# Patient Record
(demographics unavailable — no encounter records)

---

## 2024-12-16 NOTE — DISCUSSION/SUMMARY
[Medication Risks Reviewed] : Medication risks reviewed [Surgical risks reviewed] : Surgical risks reviewed [de-identified] : This is a 58-year-old female with bone-on-bone left hip osteoarthritis with chronic pain. She failed to response to rest activity modification and use of Tylenol and Advil. She has a history of a right hip replacement on February 17, 2022 with good outcome. The nature of condition and treatment options were discussed in detail. Pt is a candidate for left GUSTAVO, and she has scheduled for surgery to proceed on 2/6/2024.The surgery was discussed in detail including pre-op and post-op. The pt is having worse pain with walking, stairs, exercise, getting into and out of a car, and at nighttime. Her quality of life is deteriorating. The pt has exhausted over 3 months of conservative treatment including cortisone injections, home therapy, anti-inflammatories, Tylenol. I believe left GUSTAVO is reasonable.  All questions and concerns were answered.  The patient has been counseled regarding the elevated risks associated with surgical complications in patients with a BMI> 35. The patient demonstrates an understanding of the increased risk. After a lengthy discussion, the patient agreed to make a coordinated effort at weight loss prior to surgical intervention. The patient understands our BMI policy and they will make a conscious effort to improve their BMI.  The patient is a 58 year old individual with end stage arthritis of their left hip joint. Based upon the patient's continued symptoms and failure to respond to conservative treatment, including Tylenol and anti-inflammatories, physical therapy, activity modification , local modalities such as apply ice or heat, and injections. I have recommended a left hip replacement for this patient. A long discussion took place with the patient describing what a total joint replacement is and what the procedure would entail. A total hip arthroplasty model, similar to the implant that will be used during the operation, was utilized to demonstrate and to discuss the various bearing surfaces of the implants. The hospitalization and post-operative care and rehabilitation were also discussed. The use of perioperative antibiotics and DVT prophylaxis were discussed. The risk, benefits and alternatives to a surgical intervention were discussed at length with the patient. The patient was also advised of risks related to the medical comorbidities and elevated body mass index (BMI).  A lengthy discussion took place to review the most common complications including but not limited to: deep vein thrombosis, pulmonary embolus, heart attack, stroke, infection, wound breakdown, numbness, damage to nerves, tendon, muscles, arteries or other blood vessels, death and other possible complications from anesthesia. The patient was told that we will take steps to minimize these risks by using sterile technique, antibiotics and DVT prophylaxis when appropriate and follow the patient postoperatively in the office setting to monitor progress. The possibility of recurrent pain, no improvement in pain and actual worsening of pain were also discussed with the patient. The discharge plan of care focused on the patient going home following surgery.  The patient was encouraged to make the necessary arrangements to have someone stay with them when they are discharged home.  Following discharge, a home care nurse will visit the patient.  The home care nurse will open your home care case and request home physical therapy services.  Home physical therapy will commence following discharge provided it is appropriate and covered by the health insurance benefit plan.  The benefits of surgery were discussed with the patient including the potential for improving his/her current clinical condition through operative intervention. Alternatives to surgical intervention including continued conservative management were also discussed in detail. All questions were answered to the satisfaction of the patient. The treatment plan of care, as well as a model of a total hip implants equivalent to the one that will be used for their total joint replacement, was shared with the patient.  The patient agreed to the plan of care as well as the use of implants in their total joint replacement.

## 2024-12-16 NOTE — REVIEW OF SYSTEMS
[Joint Pain] : joint pain [Joint Stiffness] : joint stiffness [Joint Swelling] : joint swelling [Negative] : Heme/Lymph [FreeTextEntry9] : Left hip

## 2024-12-16 NOTE — END OF VISIT
[FreeTextEntry3] : I, Andrei Jerome, acted solely as a scribe for Dr. Tyrone Avina on this date 12/16/2024.  I, Tyrone Avina MD, personally performed the services described in the documentation, reviewed the documentation recorded by the scribe in my presence and it accurately and completely records my words and actions.

## 2024-12-16 NOTE — HISTORY OF PRESENT ILLNESS
[Pain Location] : pain [Worsening] : worsening [5] : a current pain level of 5/10 [7] : a maximum pain level of 7/10 [Walking] : walking [None] : No exacerbating factors are noted [Acetaminophen] : relieved by acetaminophen [NSAIDs] : relieved by nonsteroidal anti-inflammatory drugs [de-identified] : This is a 57-year-old female with left hip pain. She is status post right total hip arthroplasty on February 17, 2022 she is very happy with surgical outcome.  Regarding the left hip, she complains of left groin pain after walking long distance she has pain even at night resting. She has been taking Tylenol and Advil without relief. She has tried conservative management including physical therapy for more than 3 months and 3 cortisone injections in the left hip. Last injection was in September 2024.  She is considering left total hip arthroplasty.

## 2025-01-09 NOTE — ASSESSMENT
[FreeTextEntry1] : Patient is seen today in the office for initial evaluation of right heel pain.  On physical exam she has had noted to have intact neurovascular status of both feet the right heel is noted to have minimal discomfort with palpation.  The Achilles tendon are within normal limits.  Discussed etiology of plantar fasciitis. She will start stretching the calf as directed and wear the night splint that her  has at home. She is advised to start the Voltaren gel to the heel twice daily Discussed immobilization as another treatment modality if pain does not continue to improve and resolve Follow-up with Dr. Avina for joint replacement left lower extremity as planned

## 2025-01-09 NOTE — PHYSICAL EXAM
[General Appearance - Alert] : alert [General Appearance - In No Acute Distress] : in no acute distress [Ankle Swelling (On Exam)] : not present [Varicose Veins Of Lower Extremities] : not present [] : not present [1+] : left foot dorsalis pedis 1+ [de-identified] : pain at the right medial heel is minimal without any erythema  [Skin Color & Pigmentation] : normal skin color and pigmentation [Oriented To Time, Place, And Person] : oriented to person, place, and time

## 2025-01-09 NOTE — HISTORY OF PRESENT ILLNESS
[FreeTextEntry1] : GREGORIA  is a 58 year old female seen int he office with c/o right foot pain that has been present for about 4 weeks after wearing heels that day. Patient states pain is in the heel and arch of the foot. She is concerned because she is having left hip replacement next month and wants to make sure her right foot is ok